# Patient Record
Sex: MALE | Race: WHITE
[De-identification: names, ages, dates, MRNs, and addresses within clinical notes are randomized per-mention and may not be internally consistent; named-entity substitution may affect disease eponyms.]

---

## 2021-02-18 ENCOUNTER — HOSPITAL ENCOUNTER (EMERGENCY)
Dept: HOSPITAL 72 - EMR | Age: 71
Discharge: HOME | End: 2021-02-18
Payer: MEDICARE

## 2021-02-18 VITALS — BODY MASS INDEX: 27.97 KG/M2 | HEIGHT: 66 IN | WEIGHT: 174 LBS

## 2021-02-18 VITALS — SYSTOLIC BLOOD PRESSURE: 146 MMHG | DIASTOLIC BLOOD PRESSURE: 84 MMHG

## 2021-02-18 DIAGNOSIS — R33.9: ICD-10-CM

## 2021-02-18 DIAGNOSIS — K59.00: Primary | ICD-10-CM

## 2021-02-18 LAB
APPEARANCE UR: CLEAR
APTT PPP: (no result) S
GLUCOSE UR STRIP-MCNC: NEGATIVE MG/DL
KETONES UR QL STRIP: NEGATIVE
LEUKOCYTE ESTERASE UR QL STRIP: NEGATIVE
NITRITE UR QL STRIP: NEGATIVE
PH UR STRIP: 6 [PH] (ref 4.5–8)
PROT UR QL STRIP: NEGATIVE
SP GR UR STRIP: 1.01 (ref 1–1.03)
UROBILINOGEN UR-MCNC: NORMAL MG/DL (ref 0–1)

## 2021-02-18 PROCEDURE — 99283 EMERGENCY DEPT VISIT LOW MDM: CPT

## 2021-02-18 PROCEDURE — 81003 URINALYSIS AUTO W/O SCOPE: CPT

## 2021-02-18 NOTE — NUR
ED Nurse Note: Patient came in the ED complaining of abdominal pain, inability 
to urinate for the past four hours, pt also reports constipation, last BM 
02/16/2021.

## 2021-02-18 NOTE — EMERGENCY ROOM REPORT
History of Present Illness


General


Chief Complaint:  Abdominal Pain


Source:  Patient





Present Illness


HPI


This is a 71-year-old male with a history of BPH.  He presents with chief 

complaint of abdominal pain and unable to urinate.  Onset for last few hours.  

He said that he was constipated today and use magnesium citrate without any 

relief.  Normal bowel movement yesterday.  In the last few hours unable to urina

te.  Now is very painful.  Pain is 9 out of 10.  Localized to the suprapubic 

area.  Never had this problem before.  No fever chills.  No nausea vomiting or 

diarrhea.  Has urge to urinate but unable.


Allergies:  


Coded Allergies:  


     No Known Allergies (Unverified , 2/18/21)





COVID-19 Screening


Contact w/high risk pt:  No


Experienced COVID-19 symptoms?:  No


COVID-19 Testing performed PTA:  Yes


COVID-19 Screening:  Negative COVID-19


COVID-19 Testing Source:  NP





Patient History


Past Medical History:  see triage record, old chart reviewed


Past Surgical History:  other


Pertinent Family History:  none


Social History:  Denies: smoking


Immunizations:  other


Reviewed Nursing Documentation:  PMH: Agreed; PSxH: Agreed





Nursing Documentation-PMH


Past Medical History:  No History, Except For





Review of Systems


Eye:  Denies: eye pain, blurred vision


ENT:  Denies: ear pain, nose congestion, throat swelling


Respiratory:  Denies: cough, shortness of breath


Cardiovascular:  Denies: chest pain, palpitations


Gastrointestinal:  Reports: abdominal pain; Denies: diarrhea, nausea, vomiting


Genitourinary:  Reports: retention


Musculoskeletal:  Denies: back pain, joint pain


Skin:  Denies: rash


Neurological:  Denies: headache, numbness


Endocrine:  Denies: increased thirst, increased urine


Hematologic/Lymphatic:  Denies: easy bruising


All Other Systems:  negative except mentioned in HPI





Physical Exam





Vital Signs








  Date Time  Temp Pulse Resp B/P (MAP) Pulse Ox O2 Delivery O2 Flow Rate FiO2


 


2/18/21 21:54 98.2 83 17 146/84 (104) 93 Room Air  





Vitals unremarkable


Sp02 EP Interpretation:  reviewed, normal


General Appearance:  well appearing, no apparent distress, alert


Head:  normocephalic, atraumatic


Eyes:  bilateral eye PERRL, bilateral eye EOMI


ENT:  hearing grossly normal, normal pharynx


Neck:  full range of motion, supple, no meningismus


Respiratory:  chest non-tender, lungs clear, normal breath sounds


Cardiovascular #1:  regular rate, rhythm, no murmur


Gastrointestinal:  normal bowel sounds, non tender, no mass, no organomegaly, no

bruit, non-distended, other - Distended bladder


Musculoskeletal:  back normal, normal range of motion, gait/station normal


Psychiatric:  mood/affect normal





Medical Decision Making


Diagnostic Impression:  


   Primary Impression:  


   Constipation


   Qualified Codes:  K59.00 - Constipation, unspecified


   Additional Impression:  


   Acute urinary retention


ER Course


Patient presents with constipation and urinary retention.  The constipation is 

probably causing his patient by pushing against the  prostate.  Arreguin showed 1 L

of urine output.  Enema resulted in a large amount of stool.  He said he felt 

better.  Will discharge home with a leg bag.  There is no evidence of any 

infection.





Last Vital Signs








  Date Time  Temp Pulse Resp B/P (MAP) Pulse Ox O2 Delivery O2 Flow Rate FiO2


 


2/18/21 21:54 98.2 83 17 146/84 (104) 93 Room Air  








Status:  improved


Disposition:  HOME, SELF-CARE


Condition:  Stable





Additional Instructions:  


Follow-up with your doctor on Monday for recheck on your Arreguin.  Return if 

symptoms worsen.











Be Christensen MD                  Feb 18, 2021 22:07

## 2021-02-18 NOTE — NUR
ER DISCHARGE NOTE:

Patient is cleared to be discharged per ERMD, pt is aox4, on room air, with 
stable vital signs. pt was given dc instructions, pt was able to verbalize 
understanding, pt id band removed. Patient is able to ambulate with steady 
gait. pt took all belongings.